# Patient Record
Sex: FEMALE | Race: WHITE | NOT HISPANIC OR LATINO | Employment: FULL TIME | URBAN - METROPOLITAN AREA
[De-identification: names, ages, dates, MRNs, and addresses within clinical notes are randomized per-mention and may not be internally consistent; named-entity substitution may affect disease eponyms.]

---

## 2017-12-26 ENCOUNTER — HOSPITAL ENCOUNTER (OUTPATIENT)
Dept: GENERAL RADIOLOGY | Facility: HOSPITAL | Age: 42
Discharge: HOME | End: 2017-12-26
Admitting: NURSE PRACTITIONER

## 2017-12-26 ENCOUNTER — TRANSCRIBE ORDERS (OUTPATIENT)
Dept: ADMINISTRATIVE | Facility: HOSPITAL | Age: 42
End: 2017-12-26

## 2017-12-26 DIAGNOSIS — M79.672 ACUTE FOOT PAIN, LEFT: ICD-10-CM

## 2017-12-26 DIAGNOSIS — Z12.31 VISIT FOR SCREENING MAMMOGRAM: Primary | ICD-10-CM

## 2017-12-26 DIAGNOSIS — M79.672 ACUTE FOOT PAIN, LEFT: Primary | ICD-10-CM

## 2017-12-26 PROCEDURE — 73630 X-RAY EXAM OF FOOT: CPT

## 2018-01-22 ENCOUNTER — HOSPITAL ENCOUNTER (OUTPATIENT)
Dept: MAMMOGRAPHY | Facility: HOSPITAL | Age: 43
Discharge: HOME | End: 2018-01-22
Attending: FAMILY MEDICINE | Admitting: FAMILY MEDICINE

## 2018-01-22 DIAGNOSIS — Z12.31 VISIT FOR SCREENING MAMMOGRAM: ICD-10-CM

## 2018-01-22 PROCEDURE — 77067 SCR MAMMO BI INCL CAD: CPT | Performed by: RADIOLOGY

## 2018-01-22 PROCEDURE — 77063 BREAST TOMOSYNTHESIS BI: CPT | Performed by: RADIOLOGY

## 2018-01-22 PROCEDURE — 77063 BREAST TOMOSYNTHESIS BI: CPT

## 2018-01-22 PROCEDURE — 77067 SCR MAMMO BI INCL CAD: CPT

## 2021-10-19 ENCOUNTER — TRANSCRIBE ORDERS (OUTPATIENT)
Dept: DIABETES SERVICES | Facility: HOSPITAL | Age: 46
End: 2021-10-19

## 2021-10-19 DIAGNOSIS — E11.9 CONTROLLED TYPE 2 DIABETES MELLITUS WITHOUT COMPLICATION, WITHOUT LONG-TERM CURRENT USE OF INSULIN (HCC): ICD-10-CM

## 2021-10-19 DIAGNOSIS — E66.01 MORBID OBESITY WITH BMI OF 45.0-49.9, ADULT (HCC): Primary | ICD-10-CM

## 2022-01-12 ENCOUNTER — TRANSCRIBE ORDERS (OUTPATIENT)
Dept: ADMINISTRATIVE | Facility: HOSPITAL | Age: 47
End: 2022-01-12

## 2022-01-12 DIAGNOSIS — Z12.31 VISIT FOR SCREENING MAMMOGRAM: Primary | ICD-10-CM

## 2022-02-15 ENCOUNTER — HOSPITAL ENCOUNTER (OUTPATIENT)
Dept: MAMMOGRAPHY | Facility: HOSPITAL | Age: 47
Discharge: HOME | End: 2022-02-15
Admitting: FAMILY MEDICINE

## 2022-02-15 DIAGNOSIS — Z12.31 VISIT FOR SCREENING MAMMOGRAM: ICD-10-CM

## 2022-02-15 PROCEDURE — 77063 BREAST TOMOSYNTHESIS BI: CPT | Performed by: RADIOLOGY

## 2022-02-15 PROCEDURE — 77067 SCR MAMMO BI INCL CAD: CPT | Performed by: RADIOLOGY

## 2022-02-15 PROCEDURE — 77063 BREAST TOMOSYNTHESIS BI: CPT

## 2022-02-15 PROCEDURE — 77067 SCR MAMMO BI INCL CAD: CPT

## 2022-11-03 ENCOUNTER — HOSPITAL ENCOUNTER (OUTPATIENT)
Dept: GENERAL RADIOLOGY | Facility: HOSPITAL | Age: 47
Discharge: HOME | End: 2022-11-03
Admitting: INTERNAL MEDICINE

## 2022-11-03 ENCOUNTER — TRANSCRIBE ORDERS (OUTPATIENT)
Dept: ADMINISTRATIVE | Facility: HOSPITAL | Age: 47
End: 2022-11-03

## 2022-11-03 DIAGNOSIS — S49.91XA RIGHT SHOULDER INJURY, INITIAL ENCOUNTER: Primary | ICD-10-CM

## 2022-11-03 DIAGNOSIS — S49.91XA RIGHT SHOULDER INJURY, INITIAL ENCOUNTER: ICD-10-CM

## 2022-11-03 PROCEDURE — 73030 X-RAY EXAM OF SHOULDER: CPT

## 2023-04-14 ENCOUNTER — HOSPITAL ENCOUNTER (OUTPATIENT)
Dept: GENERAL RADIOLOGY | Facility: HOSPITAL | Age: 48
Discharge: HOME | End: 2023-04-14
Admitting: NURSE PRACTITIONER
Payer: COMMERCIAL

## 2023-04-14 ENCOUNTER — TRANSCRIBE ORDERS (OUTPATIENT)
Dept: ADMINISTRATIVE | Facility: HOSPITAL | Age: 48
End: 2023-04-14
Payer: COMMERCIAL

## 2023-04-14 DIAGNOSIS — M24.811 INTERNAL DERANGEMENT OF RIGHT SHOULDER: ICD-10-CM

## 2023-04-14 DIAGNOSIS — M24.811 INTERNAL DERANGEMENT OF RIGHT SHOULDER: Primary | ICD-10-CM

## 2023-04-14 PROCEDURE — 73030 X-RAY EXAM OF SHOULDER: CPT

## 2023-04-24 ENCOUNTER — TRANSCRIBE ORDERS (OUTPATIENT)
Dept: ADMINISTRATIVE | Facility: HOSPITAL | Age: 48
End: 2023-04-24
Payer: COMMERCIAL

## 2023-04-24 DIAGNOSIS — Z12.31 BREAST CANCER SCREENING BY MAMMOGRAM: Primary | ICD-10-CM

## 2023-05-16 ENCOUNTER — HOSPITAL ENCOUNTER (OUTPATIENT)
Dept: MAMMOGRAPHY | Facility: HOSPITAL | Age: 48
Discharge: HOME | End: 2023-05-16
Admitting: FAMILY MEDICINE
Payer: COMMERCIAL

## 2023-05-16 DIAGNOSIS — Z12.31 BREAST CANCER SCREENING BY MAMMOGRAM: ICD-10-CM

## 2023-05-16 PROCEDURE — 77063 BREAST TOMOSYNTHESIS BI: CPT

## 2023-05-16 PROCEDURE — 77067 SCR MAMMO BI INCL CAD: CPT

## 2023-07-28 ENCOUNTER — HOSPITAL ENCOUNTER (OUTPATIENT)
Dept: GENERAL RADIOLOGY | Facility: HOSPITAL | Age: 48
Discharge: HOME | End: 2023-07-28
Admitting: NURSE PRACTITIONER
Payer: COMMERCIAL

## 2023-07-28 ENCOUNTER — TRANSCRIBE ORDERS (OUTPATIENT)
Dept: ADMINISTRATIVE | Facility: HOSPITAL | Age: 48
End: 2023-07-28
Payer: COMMERCIAL

## 2023-07-28 DIAGNOSIS — M79.672 ACUTE FOOT PAIN, LEFT: ICD-10-CM

## 2023-07-28 DIAGNOSIS — M79.672 ACUTE FOOT PAIN, LEFT: Primary | ICD-10-CM

## 2023-07-28 PROCEDURE — 73630 X-RAY EXAM OF FOOT: CPT

## 2023-11-15 ENCOUNTER — OFFICE VISIT (OUTPATIENT)
Dept: GASTROENTEROLOGY | Facility: CLINIC | Age: 48
End: 2023-11-15
Payer: COMMERCIAL

## 2023-11-15 VITALS — BODY MASS INDEX: 38.25 KG/M2 | WEIGHT: 229.6 LBS | HEIGHT: 65 IN

## 2023-11-15 DIAGNOSIS — Z78.9 AT AVERAGE RISK FOR COLON CANCER: ICD-10-CM

## 2023-11-15 DIAGNOSIS — R10.13 DYSPEPSIA: Primary | ICD-10-CM

## 2023-11-15 DIAGNOSIS — R11.0 NAUSEA: ICD-10-CM

## 2023-11-15 RX ORDER — LEVOCETIRIZINE DIHYDROCHLORIDE 5 MG/1
5 TABLET, FILM COATED ORAL EVERY EVENING
COMMUNITY

## 2023-11-15 RX ORDER — ESCITALOPRAM OXALATE 20 MG/1
20 TABLET ORAL DAILY
COMMUNITY

## 2023-11-15 RX ORDER — TIRZEPATIDE 10 MG/.5ML
INJECTION, SOLUTION SUBCUTANEOUS
COMMUNITY
Start: 2023-10-25

## 2023-11-15 RX ORDER — TRIAMTERENE AND HYDROCHLOROTHIAZIDE 75; 50 MG/1; MG/1
1 TABLET ORAL DAILY
COMMUNITY

## 2023-11-15 RX ORDER — IBUPROFEN 600 MG/1
TABLET ORAL EVERY 8 HOURS PRN
COMMUNITY

## 2023-11-15 NOTE — PROGRESS NOTES
GASTROENTEROLOGY OFFICE NOTE  Lidia Garrido  [id number]  1975      Chief Complaint   Patient presents with    Chronic nausea        HISTORY OF PRESENT ILLNESS:  First visit to me for this very pleasant 48-year-old -American female is here today with nausea.  This has been a longstanding complaint.  Is been present for about 2 years and precedes the use of any GLP-1 receptor antagonist.  Her nausea, and occasional vomiting, has been a persistent symptom which is more or less daily.  It is intermittent and does not last all day long.  It can sometimes be exacerbated by foods but this is not consistent feature.  She can notice that sometimes upon awakening in the morning before she even gotten out of bed or had a chance to eat anything.  It is unrelated to time of day, activities, food intake, position.  Vomiting rarely occurs and sometimes she states that even just brushing her teeth can cause her to vomit but, again, the vomiting is a relatively rare    She states that she is Mounjaro and has been on that for the last 6 months but her symptoms clearly predate and were not exacerbated by Mounjaro    She has had a colonoscopy done by Dr. Liana Lin.  That was done this past summer and was entirely within normal limits.    There is a family history of mother and daughter that have had H. pylori gastritis.  They have both been treated for H. pylori.    From a GI standpoint, outside of her presenting complaints, she seems to be doing well and, more specifically, denies dysphagia to solids, odynophagia, early satiety, unexplained weight loss, melena or bright red blood per rectum.    PAST MEDICAL HISTORY  Past Medical History:    Anemia    Cholelithiasis    Diabetes mellitus    Hypertension        PAST SURGICAL HISTORY  Past Surgical History:    CHOLECYSTECTOMY    COLONOSCOPY        MEDICATIONS:    Current Outpatient Medications:     escitalopram (LEXAPRO) 20 MG tablet, Take 1 tablet by mouth Daily., Disp:  ", Rfl:     ibuprofen (ADVIL,MOTRIN) 600 MG tablet, Every 8 (Eight) Hours As Needed., Disp: , Rfl:     levocetirizine (Xyzal Allergy 24HR) 5 MG tablet, Take 1 tablet by mouth Every Evening., Disp: , Rfl:     Mounjaro 10 MG/0.5ML solution pen-injector, ADMINISTER 10 MG UNDER THE SKIN WEEKLY, Disp: , Rfl:     triamterene-hydrochlorothiazide (MAXZIDE) 75-50 MG per tablet, Take 1 tablet by mouth Daily., Disp: , Rfl:     ALLERGIES  has No Known Allergies.    FAMILY HISTORY:  Cancer-related family history includes Breast cancer in her maternal grandmother and maternal great-grandmother; Ovarian cancer in her maternal aunt.  Colon Cancer-related family history includes Colon polyps in her father.    SOCIAL HISTORY  She  reports that she has never smoked. She has never used smokeless tobacco. She reports current alcohol use of about 1.0 standard drink of alcohol per week. She reports that she does not use drugs.   She is .  She has 2 children.  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  She occasionally drinks alcoholic beverages and is a non-smoker.  She does not use marijuana      PHYSICAL EXAM   Ht 165.1 cm (65\")   Wt 104 kg (229 lb 9.6 oz)   BMI 38.21 kg/m²   General: Alert and oriented x 3. In no apparent or acute distress.  and No stigmata of chronic liver disease  HEENT: Anicteric sclerae. Normal oropharynx  Neck: Supple. Without lymphadenopathy  CV: Regular rate and rhythm, S1, S2  Lungs: Clear to ausculation. Without rales, rhonchi and wheezing  Abdomen:  Soft,non-distended without palpable masses or hepatosplenomeagaly, areas of rebound tenderness or guarding.   Extremeties: without clubbing, cyanosis or edema  Neurologic:  Alert and oriented x 3 without focal motor or sensory deficits  Rectal exam: deferred     No results found for this or any previous visit.     Results Review:  I reviewed the patient's new clinical results.      ASSESSMENT  1.-Chronic nausea.  This would appear " to be centrally mediated as no correlation with GI activity (food intake etc.) having said that, exclusion of H. pylori gastritis, erosive esophagitis, eosinophilic esophagitis, celiac disease etc. as a potential explanation for her symptoms is appropriate and therefore an upper endoscopy I think is reasonable as part of her work-up.  In the meantime I like her to try gingerroot 1000 1500 mg daily.  Another option would be doxylamine/vitamin B6 as another homeopathic/over-the-counter remedies she can try before moving onto any pharmacological therapy since it would appear that any medication utilized to help with her nausea might be something she would need to take on a regular or prolonged basis.  2.-Up-to-date on her colon cancer screening, Apriso repeat colonoscopy is not necessary for 10 years    PLAN  1.-Trial of gingerroot, 1000 to 1500 mg/day in divided doses  2.-Schedule EGD for exclusion of upper GI pathology  3.-Further recommendations including consideration of doxylamine/vitamin B6 or trial of prokinetic agent as well as exclusion of gastroparesis deferred for the time being      Nicholas Wiseman MD  11/16/2023   12:37 EST

## 2023-11-16 PROBLEM — R11.0 NAUSEA: Status: ACTIVE | Noted: 2023-11-16

## 2023-11-16 PROBLEM — Z78.9 AT AVERAGE RISK FOR COLON CANCER: Status: ACTIVE | Noted: 2023-11-16

## 2023-11-16 PROBLEM — R10.13 DYSPEPSIA: Status: ACTIVE | Noted: 2023-11-16

## 2024-01-23 ENCOUNTER — OUTSIDE FACILITY SERVICE (OUTPATIENT)
Dept: GASTROENTEROLOGY | Facility: CLINIC | Age: 49
End: 2024-01-23
Payer: COMMERCIAL

## 2024-03-26 ENCOUNTER — OFFICE VISIT (OUTPATIENT)
Dept: GASTROENTEROLOGY | Facility: CLINIC | Age: 49
End: 2024-03-26
Payer: COMMERCIAL

## 2024-03-26 VITALS — BODY MASS INDEX: 35.99 KG/M2 | HEIGHT: 65 IN | WEIGHT: 216 LBS

## 2024-03-26 DIAGNOSIS — K31.84 GASTROPARESIS: Primary | ICD-10-CM

## 2024-03-26 DIAGNOSIS — R11.0 NAUSEA: ICD-10-CM

## 2024-03-26 DIAGNOSIS — K59.04 CHRONIC IDIOPATHIC CONSTIPATION: ICD-10-CM

## 2024-03-26 PROCEDURE — 99214 OFFICE O/P EST MOD 30 MIN: CPT | Performed by: INTERNAL MEDICINE

## 2024-03-26 RX ORDER — ESTRADIOL 10 UG/1
INSERT VAGINAL
COMMUNITY
Start: 2024-02-26 | End: 2024-03-29

## 2024-03-26 NOTE — PROGRESS NOTES
GASTROENTEROLOGY OFFICE NOTE  Lidia Garrido  [id number]  1975      Chief Complaint   Patient presents with    Dyspepsia, constipation,         HISTORY OF PRESENT ILLNESS:  Patient presents for follow-up of gastroparesis and chronic idiopathic constipation    Dietary modification for gastroparesis have been instituted with some limited results.  She certainly does better with low volume meals, liquid meals but feels that cooked vegetables are problematic for her.    She also has bronchitic pathic constipation and she is currently not taking anything for this.    She is not having problems with dysphagia, odynophagia, early satiety, unexplained weight loss, melena or bright red blood per rectum.    She is status post colonoscopy December 2023 which was within normal limits.    There is a family history of mother and daughter with H. pylori gastritis.    PAST MEDICAL HISTORY  Past Medical History:    Anemia    Cholelithiasis    Diabetes mellitus    Hypertension        PAST SURGICAL HISTORY  Past Surgical History:    CHOLECYSTECTOMY    COLONOSCOPY    UPPER GASTROINTESTINAL ENDOSCOPY        MEDICATIONS:    Current Outpatient Medications:     escitalopram (LEXAPRO) 20 MG tablet, Take 1 tablet by mouth Daily., Disp: , Rfl:     ibuprofen (ADVIL,MOTRIN) 600 MG tablet, Every 8 (Eight) Hours As Needed., Disp: , Rfl:     Mounjaro 10 MG/0.5ML solution pen-injector, ADMINISTER 10 MG UNDER THE SKIN WEEKLY, Disp: , Rfl:     triamterene-hydrochlorothiazide (MAXZIDE) 75-50 MG per tablet, Take 1 tablet by mouth Daily., Disp: , Rfl:     estradiol (VAGIFEM) 10 MCG tablet vaginal tablet, INSERT 1 TABLET VAGINALLY 2 TIMES PER WEEK (Patient not taking: Reported on 3/26/2024), Disp: , Rfl:     levocetirizine (Xyzal Allergy 24HR) 5 MG tablet, Take 1 tablet by mouth Every Evening. (Patient not taking: Reported on 3/26/2024), Disp: , Rfl:     ALLERGIES  has No Known Allergies.    FAMILY HISTORY:  Cancer-related family history includes  "Breast cancer in her maternal grandmother and maternal great-grandmother; Ovarian cancer in her maternal aunt.  Colon Cancer-related family history includes Colon polyps in her father.    SOCIAL HISTORY  She  reports that she has never smoked. She has never used smokeless tobacco. She reports current alcohol use of about 1.0 standard drink of alcohol per week. She reports that she does not use drugs.   .  2 children.  Works in finding employment for individuals special needs.  She occasionally drinks alcohol.  Non-smoker.  Does not use marijuana.      PHYSICAL EXAM   Ht 165.1 cm (65\")   Wt 98 kg (216 lb)   BMI 35.94 kg/m²   General: Pleasant, no apparent acute distress.  Alert and oriented.  HEENT: Anicteric sclera  Lungs: Grossly normal respiration without labored breathing or audible wheezing noted.  Speaking in full sentences  Abdomen: Without gross or obvious distention  Neurologic: Normal cognition and affect.  Alert and oriented      ASSESSMENT  1.-Suspected gastroparesis.  Some response to dietary modification.  She think she would benefit from referral to registered dietitian for further granular details on how to implement she thinks this would be beneficial  2.-Chronic idiopathic constipation.  3.-Chronic nausea.  This felt to be perhaps related to gastroparesis    PLAN  1.-Dietary modification gastroparesis to be reviewed in further detail with registered dietitian.  Referral made  2.-MiraLAX 1 or 2 doses daily as initial management of her chronic idiopathic constipation  3.-Consider trial of prucalopride/Motegrity 2 mg daily  4.-Return appointment      Nicholas Wiseman MD  3/26/2024   14:53 EDT                  "

## 2024-03-29 PROBLEM — K59.04 CHRONIC IDIOPATHIC CONSTIPATION: Status: ACTIVE | Noted: 2024-03-29

## 2024-03-29 PROBLEM — K31.84 GASTROPARESIS: Status: ACTIVE | Noted: 2024-03-29

## 2024-04-15 ENCOUNTER — HOSPITAL ENCOUNTER (OUTPATIENT)
Dept: NUTRITION | Facility: HOSPITAL | Age: 49
Setting detail: RECURRING SERIES
Discharge: HOME | End: 2024-04-15

## 2024-04-15 PROCEDURE — 97802 MEDICAL NUTRITION INDIV IN: CPT

## 2024-04-15 NOTE — CONSULTS
[facility]          Initial 60 Minute Nutrition Visit    Date: 04/15/2024   Patient Name: Lidia Garrido  : 1975   MRN: [id number]   Referring Provider: Nicholas Wiseman *    Reason for Visit: gastroparesis; pt is also being treated for high BP and pt mentions an elevated A1c  Visit Format: in-person    Nutrition Assessment       Social History:   Social History     Socioeconomic History    Marital status:    Tobacco Use    Smoking status: Never    Smokeless tobacco: Never   Vaping Use    Vaping status: Never Used   Substance and Sexual Activity    Alcohol use: Yes     Alcohol/week: 1.0 standard drink of alcohol     Types: 1 Glasses of wine per week     Comment: rarely    Drug use: Never    Sexual activity: Defer     Active Problem List:   Patient Active Problem List    Diagnosis     Gastroparesis [K31.84]     Chronic idiopathic constipation [K59.04]     Dyspepsia [R10.13]     Nausea [R11.0]     At average risk for colon cancer [Z78.9]       Current Medications:   Current Outpatient Medications:     escitalopram (LEXAPRO) 20 MG tablet, Take 1 tablet by mouth Daily., Disp: , Rfl:     ibuprofen (ADVIL,MOTRIN) 600 MG tablet, Every 8 (Eight) Hours As Needed., Disp: , Rfl:     Mounjaro 10 MG/0.5ML solution pen-injector, ADMINISTER 10 MG UNDER THE SKIN WEEKLY, Disp: , Rfl:     triamterene-hydrochlorothiazide (MAXZIDE) 75-50 MG per tablet, Take 1 tablet by mouth Daily., Disp: , Rfl:     Labs: not listed in pt's chart    Hunger Vital Sign Food Insecurity Assessment:  Within the past 12 months I/we worried whether our food would run out before I/we got money to buy more: no   Within the past 12 months the food I/we bought just didn't last and I/we didn't have money to get more: no   Use of food assistance programs (WIC, food stamps, food nicole) no       Food & Nutrition Related History       Food Allergies: none  Food Intolerances: none indicated  Food Behavior:  "restrictive behavior  Nutrition Impact Symptoms:  nausea in the morning  Gastrointestinal conditions that impact intake or food choices: gastroparesis  Details at home: lives with   Who prepares most meals: very inconsistent    Who does grocery shopping: inconsistent    How many meals are purchased from fast food/sit down restaurants per week:  unknown; did not discuss  Difficulty chewin - Normal  Difficulty swallowin - Normal  Diet requirement related to personal preference or cultural belief:  none indicated  History of eating disorder/disordered eating habits: None  Language/communication details: english  Barriers to learning: No barriers identified at this time    24 Hour Recall:   Time Food/beverages consumed    Alcoholic beverage (pt reports she was at [facility])        Some fried pickles, corn muffin        Hot chips        Water          Additional comments: Pt reports her meals and snacks are inconsistent. Pt reports some days she will snack throughout the day and other days she doesn't eat anything all day until a small dinner. Pt primarily drinks water (60-80 ounces daily). Sometimes pt will drink a premier protein drink. Pt has tried WW in the past. Pt started mounjaro last /July and is still on it.     Anthropometrics      Height:   Ht Readings from Last 1 Encounters:   24 165.1 cm (65\")     Weight:   Wt Readings from Last 3 Encounters:   24 98 kg (216 lb)   11/15/23 104 kg (229 lb 9.6 oz)     BMI: There is no height or weight on file to calculate BMI.   Weight Change: pt reports she has lost some weight since she's been on mounjaro     Physical Activity     Barriers to physical activity: none indicated     Physical activity comments: pt reports she just recently started going to the gym again and will go 2-3 times a week and walk on the treadmill      Estimated Needs     Estimated Energy Needs: 1,600 (1.2, 300)    Estimated Protein Needs: specific needs not assessed at " this time, RD did encourage pt to consume a lean source of protein at each meal and snack     Estimated Fluid Needs: minimum of 64 ounces daily in between meals and snacks     Discussion / Education      During session, RD reviewed MNT for gastroparesis including reduced fat, fiber, and quantity at meal times. Patient was advised to consume 4-5 small meals/snacks daily to optimize digestion and manage symptoms. RD encouraged patient to increase overall intake and warned against the risks of under eating and how this may be negatively impacting digestion. RD reviewed strategies for preparing high fiber foods such as fruits and vegetables in a way that is better digested, including well cooked or pureed options. RD reviewed foods that are easily digestible that can be incorporated as part of meals and snacks, including well cooked starches/grains, low-fat lactose-free dairy, and liquids such as broth or protein shakes. RD demonstrated how to plan balanced meals using the lists of easy to digest foods, patient demonstrates understanding by modifying meals on dietary recall to meet recommendations. RD discussed complimentary alternative medicine options including peppermint and rafael oil/tea to promote digestion and mobility in addition to diet and medication. Patient was advised to increase water intake to 48-62 oz/day as tolerated.     Assessment of patient engagement: Engaged    Measurement of understanding: Patient verbalized understanding    Resources Provided: the 3 macronutrients  AND gastroparesis      Goal (s)      Goal 1: 4-5 small meals/snacks daily     Goal 2: eliminating high fiber foods     Goal 3: fat in moderation at meals and snacks     Plan of Care     PES Statement:   Altered GI function related to diagnosis of gastroparesis as evidence by gastrointestinal symptoms of constipation and nausea.     Follow Up Visit      Follow Up:   No f/u scheduled at this time    Total of 60 minutes spent with patient  on nutrition counseling. Education based on Academy of Nutrition and Dietetics guidelines. Patient was provided with RD's contact information. Thank you for this referral.

## 2025-01-14 ENCOUNTER — TRANSCRIBE ORDERS (OUTPATIENT)
Dept: ADMINISTRATIVE | Facility: HOSPITAL | Age: 50
End: 2025-01-14
Payer: COMMERCIAL

## 2025-01-14 DIAGNOSIS — Z12.31 VISIT FOR SCREENING MAMMOGRAM: Primary | ICD-10-CM

## 2025-01-21 ENCOUNTER — HOSPITAL ENCOUNTER (OUTPATIENT)
Dept: MAMMOGRAPHY | Facility: HOSPITAL | Age: 50
Discharge: HOME | End: 2025-01-21
Payer: COMMERCIAL

## 2025-01-21 DIAGNOSIS — Z12.31 VISIT FOR SCREENING MAMMOGRAM: ICD-10-CM

## 2025-01-22 ENCOUNTER — TRANSCRIBE ORDERS (OUTPATIENT)
Dept: ADMINISTRATIVE | Facility: HOSPITAL | Age: 50
End: 2025-01-22
Payer: COMMERCIAL

## 2025-01-22 DIAGNOSIS — N64.4 BREAST TENDERNESS: Primary | ICD-10-CM

## 2025-02-03 ENCOUNTER — HOSPITAL ENCOUNTER (OUTPATIENT)
Facility: HOSPITAL | Age: 50
Discharge: HOME | End: 2025-02-03
Admitting: NURSE PRACTITIONER
Payer: COMMERCIAL

## 2025-02-03 DIAGNOSIS — N64.4 BREAST TENDERNESS: ICD-10-CM

## 2025-02-03 PROCEDURE — G0279 TOMOSYNTHESIS, MAMMO: HCPCS

## 2025-02-03 PROCEDURE — 77062 BREAST TOMOSYNTHESIS BI: CPT | Performed by: RADIOLOGY

## 2025-02-03 PROCEDURE — 77066 DX MAMMO INCL CAD BI: CPT

## 2025-02-03 PROCEDURE — 77066 DX MAMMO INCL CAD BI: CPT | Performed by: RADIOLOGY

## 2025-03-12 ENCOUNTER — HOSPITAL ENCOUNTER (OUTPATIENT)
Dept: GENERAL RADIOLOGY | Facility: HOSPITAL | Age: 50
Discharge: HOME | End: 2025-03-12
Admitting: NURSE PRACTITIONER
Payer: COMMERCIAL

## 2025-03-12 ENCOUNTER — TRANSCRIBE ORDERS (OUTPATIENT)
Dept: GENERAL RADIOLOGY | Facility: HOSPITAL | Age: 50
End: 2025-03-12
Payer: COMMERCIAL

## 2025-03-12 DIAGNOSIS — J45.909 REACTIVE AIRWAY DISEASE WITH WHEEZING WITHOUT COMPLICATION, UNSPECIFIED ASTHMA SEVERITY, UNSPECIFIED WHETHER PERSISTENT: Primary | ICD-10-CM

## 2025-03-12 DIAGNOSIS — J45.909 REACTIVE AIRWAY DISEASE WITH WHEEZING WITHOUT COMPLICATION, UNSPECIFIED ASTHMA SEVERITY, UNSPECIFIED WHETHER PERSISTENT: ICD-10-CM

## 2025-03-12 PROCEDURE — 71046 X-RAY EXAM CHEST 2 VIEWS: CPT
